# Patient Record
Sex: FEMALE | Race: ASIAN | NOT HISPANIC OR LATINO | URBAN - METROPOLITAN AREA
[De-identification: names, ages, dates, MRNs, and addresses within clinical notes are randomized per-mention and may not be internally consistent; named-entity substitution may affect disease eponyms.]

---

## 2019-07-08 ENCOUNTER — EMERGENCY (EMERGENCY)
Facility: HOSPITAL | Age: 29
LOS: 1 days | Discharge: HOME | End: 2019-07-08
Admitting: EMERGENCY MEDICINE
Payer: SELF-PAY

## 2019-07-08 VITALS
OXYGEN SATURATION: 100 % | TEMPERATURE: 98 F | RESPIRATION RATE: 16 BRPM | SYSTOLIC BLOOD PRESSURE: 119 MMHG | DIASTOLIC BLOOD PRESSURE: 66 MMHG | HEART RATE: 72 BPM

## 2019-07-08 PROCEDURE — 99283 EMERGENCY DEPT VISIT LOW MDM: CPT

## 2019-07-08 RX ORDER — IBUPROFEN 200 MG
1 TABLET ORAL
Qty: 20 | Refills: 0
Start: 2019-07-08

## 2019-07-08 RX ORDER — DIAZEPAM 5 MG
1 TABLET ORAL
Qty: 10 | Refills: 0
Start: 2019-07-08

## 2019-07-08 NOTE — ED PROVIDER NOTE - NSFOLLOWUPINSTRUCTIONS_ED_ALL_ED_FT
Take motrin 600mg every 8 hours; valium 5mg 3x/day; avoid any strenuous activity; heat packs to affected area; Drink plenty of fluids; rest; Follow up with your pmd in one week. return to ED for any worsening symptoms.

## 2019-07-08 NOTE — ED PROVIDER NOTE - CLINICAL SUMMARY MEDICAL DECISION MAKING FREE TEXT BOX
30 yo female here with mid back pain worsening over the past week.  Pain worse with movement, no red flags, pt well appearing, vss, ambulates without difficulty; MSK pain-treat with nsaids and muscle relaxer.

## 2019-07-08 NOTE — ED PROVIDER NOTE - OBJECTIVE STATEMENT
30 yo female c no sig pmhx presents to ED c/o mid back pain worsening over the past week.  Pt visiting from [place], states thinks when she lifted her luggage she made the pain worse.   Pt has not taken anything for the pain.  Pain non radiating, constant, worse with movement.  Currently pain 7/10. Denies any CP, sob, cough, abd pain, n/v, fever, chills or urinary sx.